# Patient Record
Sex: FEMALE | Race: OTHER
[De-identification: names, ages, dates, MRNs, and addresses within clinical notes are randomized per-mention and may not be internally consistent; named-entity substitution may affect disease eponyms.]

---

## 2018-07-26 ENCOUNTER — HOSPITAL ENCOUNTER (EMERGENCY)
Dept: HOSPITAL 72 - EMR | Age: 33
Discharge: HOME | End: 2018-07-26
Payer: COMMERCIAL

## 2018-07-26 VITALS — DIASTOLIC BLOOD PRESSURE: 71 MMHG | SYSTOLIC BLOOD PRESSURE: 123 MMHG

## 2018-07-26 VITALS — WEIGHT: 151 LBS | HEIGHT: 64 IN | BODY MASS INDEX: 25.78 KG/M2

## 2018-07-26 DIAGNOSIS — O02.1: Primary | ICD-10-CM

## 2018-07-26 LAB
ADD MANUAL DIFF: NO
ALBUMIN SERPL-MCNC: 4.1 G/DL (ref 3.4–5)
ALBUMIN/GLOB SERPL: 1 {RATIO} (ref 1–2.7)
ALP SERPL-CCNC: 75 U/L (ref 46–116)
ALT SERPL-CCNC: 47 U/L (ref 12–78)
ANION GAP SERPL CALC-SCNC: 12 MMOL/L (ref 5–15)
APPEARANCE UR: (no result)
APTT PPP: (no result) S
AST SERPL-CCNC: 25 U/L (ref 15–37)
BASOPHILS NFR BLD AUTO: 0.6 % (ref 0–2)
BILIRUB SERPL-MCNC: 0.3 MG/DL (ref 0.2–1)
BUN SERPL-MCNC: 6 MG/DL (ref 7–18)
CALCIUM SERPL-MCNC: 9.5 MG/DL (ref 8.5–10.1)
CHLORIDE SERPL-SCNC: 100 MMOL/L (ref 98–107)
CO2 SERPL-SCNC: 26 MMOL/L (ref 21–32)
CREAT SERPL-MCNC: 0.6 MG/DL (ref 0.55–1.3)
EOSINOPHIL NFR BLD AUTO: 0.8 % (ref 0–3)
ERYTHROCYTE [DISTWIDTH] IN BLOOD BY AUTOMATED COUNT: 10.6 % (ref 11.6–14.8)
GLOBULIN SER-MCNC: 4.1 G/DL
GLUCOSE UR STRIP-MCNC: NEGATIVE MG/DL
HCT VFR BLD CALC: 41.6 % (ref 37–47)
HGB BLD-MCNC: 14.2 G/DL (ref 12–16)
KETONES UR QL STRIP: NEGATIVE
LEUKOCYTE ESTERASE UR QL STRIP: NEGATIVE
LYMPHOCYTES NFR BLD AUTO: 19.3 % (ref 20–45)
MCV RBC AUTO: 87 FL (ref 80–99)
MONOCYTES NFR BLD AUTO: 4.4 % (ref 1–10)
NEUTROPHILS NFR BLD AUTO: 74.9 % (ref 45–75)
NITRITE UR QL STRIP: NEGATIVE
PH UR STRIP: 6.5 [PH] (ref 4.5–8)
PLATELET # BLD: 267 K/UL (ref 150–450)
POTASSIUM SERPL-SCNC: 3.8 MMOL/L (ref 3.5–5.1)
PROT UR QL STRIP: NEGATIVE
RBC # BLD AUTO: 4.75 M/UL (ref 4.2–5.4)
SODIUM SERPL-SCNC: 138 MMOL/L (ref 136–145)
SP GR UR STRIP: 1 (ref 1–1.03)
UROBILINOGEN UR-MCNC: NORMAL MG/DL (ref 0–1)
WBC # BLD AUTO: 8.9 K/UL (ref 4.8–10.8)

## 2018-07-26 PROCEDURE — 81003 URINALYSIS AUTO W/O SCOPE: CPT

## 2018-07-26 PROCEDURE — 36415 COLL VENOUS BLD VENIPUNCTURE: CPT

## 2018-07-26 PROCEDURE — 76801 OB US < 14 WKS SINGLE FETUS: CPT

## 2018-07-26 PROCEDURE — 86901 BLOOD TYPING SEROLOGIC RH(D): CPT

## 2018-07-26 PROCEDURE — 83690 ASSAY OF LIPASE: CPT

## 2018-07-26 PROCEDURE — 85025 COMPLETE CBC W/AUTO DIFF WBC: CPT

## 2018-07-26 PROCEDURE — 99284 EMERGENCY DEPT VISIT MOD MDM: CPT

## 2018-07-26 PROCEDURE — 81025 URINE PREGNANCY TEST: CPT

## 2018-07-26 PROCEDURE — 86900 BLOOD TYPING SEROLOGIC ABO: CPT

## 2018-07-26 PROCEDURE — 84702 CHORIONIC GONADOTROPIN TEST: CPT

## 2018-07-26 PROCEDURE — 80053 COMPREHEN METABOLIC PANEL: CPT

## 2018-07-26 NOTE — EMERGENCY ROOM REPORT
History of Present Illness


General


Chief Complaint:  Pregnancy Complications


Source:  Patient





Present Illness


HPI


32-year-old female presents to the emergency department complaining of 3 days 

of vaginal bleeding that she describes as scant in nature going through 

approximately 3-4 panti-liners per day.  Patient states she is 13 weeks 

pregnant but her last OB/GYN appointment ultrasound was performed 1 week ago 

and  IUP was not established and they could not find a heart rate.  Patient 

reports lower abdominal cramping similar to period cramps.  Denies nausea, 

vomiting, fevers, chills.  Denies swollen tender lymph nodes.  Patient denies 

adnexal tenderness or pain.  She is 


Allergies:  


Coded Allergies:  


     No Known Allergies (Unverified , 18)





Patient History


Past Medical History:  see triage record


Past Surgical History:  none


Pertinent Family History:  none


Last Menstrual Period:  18


Pregnant Now:  Yes - 14


:  2


Para:  0


Reviewed Nursing Documentation:  PMH: Agreed; PSxH: Agreed





Nursing Documentation-PMH


Past Medical History:  No Stated History





Review of Systems


All Other Systems:  negative except mentioned in HPI





Physical Exam





Vital Signs








  Date Time  Temp Pulse Resp B/P (MAP) Pulse Ox O2 Delivery O2 Flow Rate FiO2


 


18 13:20 98.3 76 18 125/76 98 Room Air  





 98.2       








Sp02 EP Interpretation:  reviewed, normal


General Appearance:  no apparent distress, alert, GCS 15, non-toxic


Head:  normocephalic, atraumatic


Eyes:  bilateral eye normal inspection, bilateral eye PERRL


ENT:  hearing grossly normal, normal voice


Neck:  full range of motion


Respiratory:  chest non-tender, lungs clear, normal breath sounds, speaking 

full sentences


Cardiovascular #1:  regular rate, rhythm


Gastrointestinal:  normal bowel sounds, non tender, soft


Rectal:  deferred


Genitourinary:  normal inspection, no CVA tenderness, adnexa normal, os closed


Musculoskeletal:  back normal, gait/station normal, normal range of motion, non-

tender


Neurologic:  alert, oriented x3, responsive, motor strength/tone normal, 

sensory intact, normal gait, speech normal, grossly normal


Psychiatric:  judgement/insight normal


Skin:  normal color, no rash, warm/dry, well hydrated


Lymphatic:  no adenopathy





Medical Decision Making


PA Attestation


Dr. Sutton is my supervising physician whom pt. management has been discussed 

with.


Diagnostic Impression:  


 Primary Impression:  


 Non-viable pregnancy


 Additional Impression:  


 Retained products of conception


ER Course


32-year-old female presents to the emergency department complaining of 3 days 

of vaginal bleeding that she describes as scant in nature going through 

approximately 3-4 panti-liners per day.  Patient states she is 13 weeks 

pregnant but her last OB/GYN appointment ultrasound was performed 1 week ago 

and  IUP was not established and they could not find a heart rate.  Patient 

reports lower abdominal cramping similar to period cramps.  Denies nausea, 

vomiting, fevers, chills.  Denies swollen tender lymph nodes.  Patient denies 

adnexal tenderness or pain.  She is 





Ddx considered but are not limited to: Fibroid, ectopic pregnancy, Fibroid, 

Spontaneous , 


Vital signs: are WNL, pt. is afebrile 


 


H&PE are most consistent with: possible miscarriage. 


ORDERS: 





-CBC, CMP: Unremarkable


-Urine hcg- Positive 


-serum Hcg Quant:  303 


- Blood/RH type - see attached labs : O POSITIVE





-Pelvic US complete- no evidence of viable fetus, subchorionic hemorrhage 

noted. 





ED INTERVENTIONS: None at this time. 





D/w pt. the results of imaging. Also d/w pt. that i consulted with OBGYN Dr. Mcgee who recommends outpatient follow up and that she will most likely require 

surgical D&C





DISCHARGE: At this time pt. is stable for d/c to home. Will provide printed 

patient care instructions, and any necessary prescriptions. Care plan and 

follow up instructions have been discussed with the patient prior to discharge.





Labs








Test


  18


13:48


 


White Blood Count


  8.9 K/UL


(4.8-10.8)


 


Red Blood Count


  4.75 M/UL


(4.20-5.40)


 


Hemoglobin


  14.2 G/DL


(12.0-16.0)


 


Hematocrit


  41.6 %


(37.0-47.0)


 


Mean Corpuscular Volume 87 FL (80-99) 


 


Mean Corpuscular Hemoglobin


  29.9 PG


(27.0-31.0)


 


Mean Corpuscular Hemoglobin


Concent 34.2 G/DL


(32.0-36.0)


 


Red Cell Distribution Width


  10.6 %


(11.6-14.8)


 


Platelet Count


  267 K/UL


(150-450)


 


Mean Platelet Volume


  6.2 FL


(6.5-10.1)


 


Neutrophils (%) (Auto)


  74.9 %


(45.0-75.0)


 


Lymphocytes (%) (Auto)


  19.3 %


(20.0-45.0)


 


Monocytes (%) (Auto)


  4.4 %


(1.0-10.0)


 


Eosinophils (%) (Auto)


  0.8 %


(0.0-3.0)


 


Basophils (%) (Auto)


  0.6 %


(0.0-2.0)


 


Urine Color Pale yellow 


 


Urine Appearance


  Slightly


cloudy


 


Urine pH 6.5 (4.5-8.0) 


 


Urine Specific Gravity


  1.005


(1.005-1.035)


 


Urine Protein


  Negative


(NEGATIVE)


 


Urine Glucose (UA)


  Negative


(NEGATIVE)


 


Urine Ketones


  Negative


(NEGATIVE)


 


Urine Occult Blood 4+ (NEGATIVE) 


 


Urine Nitrite


  Negative


(NEGATIVE)


 


Urine Bilirubin


  Negative


(NEGATIVE)


 


Urine Urobilinogen


  Normal MG/DL


(0.0-1.0)


 


Urine Leukocyte Esterase


  Negative


(NEGATIVE)


 


Urine RBC


  2-4 /HPF (0 -


2)


 


Urine WBC


  0-2 /HPF (0 -


2)


 


Urine Squamous Epithelial


Cells Few /LPF


(NONE/OCC)


 


Urine Bacteria


  Few /HPF


(NONE)


 


Urine HCG, Qualitative


  Positive


(NEGATIVE)


 


Sodium Level


  138 MMOL/L


(136-145)


 


Potassium Level


  3.8 MMOL/L


(3.5-5.1)


 


Chloride Level


  100 MMOL/L


()


 


Carbon Dioxide Level


  26 MMOL/L


(21-32)


 


Anion Gap


  12 mmol/L


(5-15)


 


Blood Urea Nitrogen 6 mg/dL (7-18) 


 


Creatinine


  0.6 MG/DL


(0.55-1.30)


 


Estimat Glomerular Filtration


Rate > 60 mL/min


(>60)


 


Glucose Level


  99 MG/DL


()


 


Calcium Level


  9.5 MG/DL


(8.5-10.1)


 


Total Bilirubin


  0.3 MG/DL


(0.2-1.0)


 


Aspartate Amino Transf


(AST/SGOT) 25 U/L (15-37) 


 


 


Alanine Aminotransferase


(ALT/SGPT) 47 U/L (12-78) 


 


 


Alkaline Phosphatase


  75 U/L


()


 


Total Protein


  8.2 G/DL


(6.4-8.2)


 


Albumin


  4.1 G/DL


(3.4-5.0)


 


Globulin 4.1 g/dL 


 


Albumin/Globulin Ratio 1.0 (1.0-2.7) 


 


Lipase


  70 U/L


()


 


Human Chorionic Gonadotropin,


Quant 303 mIU/mL


(1-6)








CT/MRI/US Diagnostic Results


CT/MRI/US Diagnostic Results :  


   Imaging Test Ordered:  Pelvic US


   Impression


"there is what appears to be a very unusual  and amorphous soft tissue which 

may represent a combination of non viable fetus and blood clots.   There is 

probably a subchorionic hemorrhage of indeterminant age. The placenta could not 

be reliably identified in the furthest position relative to the internal 

cervical os is indeterminate."Per official radiology report- Please see report 

for specific details.





Last Vital Signs








  Date Time  Temp Pulse Resp B/P (MAP) Pulse Ox O2 Delivery O2 Flow Rate FiO2


 


18 13:20 98.3 76 18 125/76 98 Room Air  





 98.2       








Disposition:  HOME, SELF-CARE


Condition:  Stable


Scripts


Ibuprofen* (MOTRIN*) 400 Mg Tablet


400 MG ORAL THREE TIMES A DAY, #20 TAB 0 Refills


   Prov: Loyda Castanon         18 


Tramadol Hcl* (ULTRAM*) 50 Mg Tablet


50 MG ORAL Q6H PRN for For Pain, #15 TAB 0 Refills


   Prov: Loyda Castanon         18


Patient Instructions:  Incomplete Miscarriage





Additional Instructions:  


Take medications as directed. 





 ** Follow up with a OBGYN  within 72 Hours, even if your symptoms have 

resolved. ** 





Return sooner to ED if new symptoms occur, or current symptoms become worse. 











- Please note that this Emergency Department Report was dictated using [employer] technology software, occasionally this can lead to 

erroneous entry secondary to interpretation by the dictation equipment.











Loyda Castanon 2018 13:35

## 2018-07-26 NOTE — DIAGNOSTIC IMAGING REPORT
Indication: Vaginal spotting, cramping, positive pregnancy test

 

Technique: Transabdominal and transvaginal images

 

Comparison: none

 

Findings: Uterus measures 12.8 cm in length by 5.6 cm AP. Within the endometrium,

there is a fluid collection, presumably a gestational sac, which measures 6.3 x 4.6

cm in diameter. The sac contains low level internal echoes. It also contains an

amorphous soft tissue structure which is not readily identifiable as a fetus. No

fetal heart activity is demonstrated The position of the placenta is difficult to

discern. Ill-defined mixed echogenicity material is seen peripheral to what is

apparently the chorion in the lower uterine segment and some of this material is seen

over the internal cervical os.

 

There is a subserosal anterior fundal fibroid noted. There is trace free cul-de-sac

fluid. Ovaries are not well demonstrated, but no gross adnexal mass is visualized.

 

Impression: There is what appears to be a very unusual 6 x 3 x 4.6 gestational sac

within the uterus, containing fluid with low level internal echoes, and amorphous

soft tissue material which may represent a combination of nonviable fetus and blood

clots. No fetal heart activity or other findings to suggest viable intrauterine

pregnancy demonstrated.

 

There is also probably a subchorionic hemorrhage of indeterminate age

 

Note that the placenta could not be reliably identified and therefore this position

relative to the internal cervical os is indeterminate

 

Critical value findings were discussed by phone with Loyda Castanon in the emergency

room at the time of interpretation